# Patient Record
Sex: FEMALE | Race: WHITE | NOT HISPANIC OR LATINO | RURAL
[De-identification: names, ages, dates, MRNs, and addresses within clinical notes are randomized per-mention and may not be internally consistent; named-entity substitution may affect disease eponyms.]

---

## 2024-02-15 DIAGNOSIS — J35.1 SWOLLEN TONSIL: Primary | ICD-10-CM

## 2024-02-27 ENCOUNTER — OFFICE VISIT (OUTPATIENT)
Dept: DERMATOLOGY | Facility: CLINIC | Age: 69
End: 2024-02-27
Payer: COMMERCIAL

## 2024-02-27 DIAGNOSIS — Z79.899 HIGH RISK MEDICATION USE: ICD-10-CM

## 2024-02-27 DIAGNOSIS — L30.9 DERMATITIS, UNSPECIFIED: Primary | ICD-10-CM

## 2024-02-27 PROCEDURE — 99204 OFFICE O/P NEW MOD 45 MIN: CPT | Mod: ,,, | Performed by: STUDENT IN AN ORGANIZED HEALTH CARE EDUCATION/TRAINING PROGRAM

## 2024-02-27 PROCEDURE — 1159F MED LIST DOCD IN RCRD: CPT | Mod: ,,, | Performed by: STUDENT IN AN ORGANIZED HEALTH CARE EDUCATION/TRAINING PROGRAM

## 2024-02-27 RX ORDER — METRONIDAZOLE 7.5 MG/G
GEL TOPICAL 2 TIMES DAILY
Qty: 45 G | Refills: 5 | Status: SHIPPED | OUTPATIENT
Start: 2024-02-27 | End: 2024-03-28 | Stop reason: SDUPTHER

## 2024-02-27 RX ORDER — SUCRALFATE 1 G/1
TABLET ORAL
COMMUNITY
Start: 2023-10-06

## 2024-02-27 RX ORDER — MEPOLIZUMAB 100 MG/ML
INJECTION, SOLUTION SUBCUTANEOUS
COMMUNITY

## 2024-02-27 RX ORDER — ERGOCALCIFEROL 1.25 MG/1
50000 CAPSULE ORAL
COMMUNITY
Start: 2023-10-06

## 2024-02-27 RX ORDER — DOXYCYCLINE 100 MG/1
100 CAPSULE ORAL EVERY 12 HOURS
Qty: 60 CAPSULE | Refills: 0 | Status: SHIPPED | OUTPATIENT
Start: 2024-02-27 | End: 2024-03-28 | Stop reason: SDUPTHER

## 2024-02-27 RX ORDER — ESTRADIOL 0.05 MG/D
1 PATCH TRANSDERMAL
COMMUNITY

## 2024-02-27 RX ORDER — ONDANSETRON 4 MG/1
4 TABLET, ORALLY DISINTEGRATING ORAL
COMMUNITY
Start: 2024-02-05

## 2024-02-27 RX ORDER — BUDESONIDE 180 UG/1
AEROSOL, POWDER RESPIRATORY (INHALATION) 2 TIMES DAILY
COMMUNITY
Start: 2023-10-23

## 2024-02-27 RX ORDER — OMEPRAZOLE 40 MG/1
40 CAPSULE, DELAYED RELEASE ORAL EVERY MORNING
COMMUNITY

## 2024-02-27 RX ORDER — ALBUTEROL SULFATE 2 MG/5ML
2 SYRUP ORAL
COMMUNITY

## 2024-02-27 RX ORDER — MONTELUKAST SODIUM 10 MG/1
TABLET, FILM COATED ORAL
COMMUNITY
Start: 2023-11-07

## 2024-02-27 NOTE — PROGRESS NOTES
[facility]  Jhonny Medina MD    [address] [place] [address]  [phone]    Fax: [phone]    Patient Name: Caron Sandhu  Medical Record Number: [id number]  PCP: Eliza Castillo MD  Age: 68 y.o. : 1955  Contact: [phone] (home)     CC: rash  History of Present Illness:     Caron Sandhu is a 68 y.o.  female with no history of skin cancer  who presents to clinic today for rash on face. This has been present for one months. Symptoms includes redness, pustules and irritation. Previous treatments includes Cetaphil and stop using her sunscreen.  She is currently seeing Rheumatology with a positive RADHA, but no definitive diagnosis has been given.     The patient has no other concerns today.    Review of Systems:     Unremarkable other than mentioned above.     Physical Exam:     General: Relaxed, oriented, alert    Skin examination of the scalp, face, neck, chest, back, abdomen, upper extremities and lower extremities were normal except for as listed below      Assessment and Plan:     1. Dermatitis Unspecified  - erythematous, monomorphic papules with occasional pustules on chin, cheeks and periorbital area   DDx: inflammatory rosacea vs perioral facial dermatitis     Plan:  - Metronidazole gel   - Doxycycline 100 mg BID x 4 weeks    Counseling  I counseled the patient regarding the following:  Skin care: Patient instructed to use gentle skin care including dove unscented soap, CeraVe moisturizing cream, and fragrance free laundry detergent.  Expectations: The patient understands that there is not a definitive diagnosis at this time. Further testing or empiric therapy may be necessary to diagnose and improve the condition.  Contact office if: The patient develops a fever, or rash dramatically worsens despite treatment.      2. High Risk Medication Monitoring : The risks and benefits of the medication were reviewed in full with the patient. Should any  side effects occur, the patient will stop the medication and contact me immediately.      Doxycycline Counseling:     Please be aware of the side effects of doxycycline:   - photosensitivity and increased risk for sunburn. When exposed to sunlight, patients should wear protective clothing, sunglasses, and sunscreen.   - birth defects: avoid pregnancy while on therapy due to potential birth defects.  - headaches or vision changes if taking with accutane   - throat irritation: stay upright for at least 30 minutes after taking and drink with a large glass of water   - GI disturbance: take with food to help prevent upset stomach   - Do not take at the same time as dairy or calcium containing supplements, as they reduce absorption. You can continue to consume these, but make sure it is not within an hour of taking the doxycycline     Please call our office with any extreme side effects.       Return to clinic in 4 weeks    AVS printed with patient instructions     Jhonny Medina MD   Mohs Surgery/Dermatologic Oncology  Dermatology

## 2024-02-27 NOTE — PATIENT INSTRUCTIONS
Doxycycline Counseling:     Please be aware of the side effects of doxycycline:   - photosensitivity and increased risk for sunburn. When exposed to sunlight, patients should wear protective clothing, sunglasses, and sunscreen.   - birth defects: avoid pregnancy while on therapy due to potential birth defects.  - headaches or vision changes if taking with accutane   - throat irritation: stay upright for at least 30 minutes after taking and drink with a large glass of water   - GI disturbance: take with food to help prevent upset stomach   - Do not take at the same time as dairy or calcium containing supplements, as they reduce absorption. You can continue to consume these, but make sure it is not within an hour of taking the doxycycline     Please call our office with any extreme side effects. '    Rosacea vs perioral facial dermatitis     Doxycyline 100 mg twice a day for four weeks    Metronidazole gel - twice day

## 2024-03-28 ENCOUNTER — OFFICE VISIT (OUTPATIENT)
Dept: DERMATOLOGY | Facility: CLINIC | Age: 69
End: 2024-03-28
Payer: COMMERCIAL

## 2024-03-28 DIAGNOSIS — L71.9 ROSACEA: Primary | ICD-10-CM

## 2024-03-28 DIAGNOSIS — Z79.899 HIGH RISK MEDICATION USE: ICD-10-CM

## 2024-03-28 DIAGNOSIS — L30.9 DERMATITIS, UNSPECIFIED: ICD-10-CM

## 2024-03-28 PROCEDURE — 1159F MED LIST DOCD IN RCRD: CPT | Mod: ,,, | Performed by: STUDENT IN AN ORGANIZED HEALTH CARE EDUCATION/TRAINING PROGRAM

## 2024-03-28 PROCEDURE — 99213 OFFICE O/P EST LOW 20 MIN: CPT | Mod: ,,, | Performed by: STUDENT IN AN ORGANIZED HEALTH CARE EDUCATION/TRAINING PROGRAM

## 2024-03-28 RX ORDER — DOXYCYCLINE 100 MG/1
100 CAPSULE ORAL EVERY 12 HOURS
Qty: 28 CAPSULE | Refills: 3 | Status: SHIPPED | OUTPATIENT
Start: 2024-03-28 | End: 2024-04-11

## 2024-03-28 RX ORDER — METRONIDAZOLE 7.5 MG/G
GEL TOPICAL 2 TIMES DAILY
Qty: 45 G | Refills: 11 | Status: SHIPPED | OUTPATIENT
Start: 2024-03-28 | End: 2025-03-28

## 2024-03-28 NOTE — PROGRESS NOTES
[facility]  Jhonny Medina MD    [address] [place] [address]  [phone]    Fax: [phone]    Patient Name: Caron Sandhu  Medical Record Number: [id number]  PCP: Eliza Castillo MD  Age: 69 y.o. : 1955  Contact: [phone] (home)     History of Present Illness:     Caron Sandhu is a 69 y.o.  female here for follow up of inflammatory rosacea treated with metronidazole gel and doxycycline 100 mg BID x 4 weeks that has improved.     The patient has no other concerns today.    Review of Systems:     Unremarkable other than mentioned above.     Physical Exam:     General: Relaxed, oriented, alert    Skin examination of the scalp, face, neck, chest, back, abdomen, upper extremities and lower extremities were normal except for as listed below      Assessment and Plan:     1. Rosacea  - Erythematous papules and pustules on cheeks and nose with background erythema     Plan:   Continue Metronidazole gel bid PRN   Doxycycline 100 mg bid PRN for 10 days for flares     Counseling.  Rosacea can be a/w cardiac disease, and can involve the eyes as well.     2. High Risk Medication Monitoring : The risks and benefits of the medication were reviewed in full with the patient. Should any side effects occur, the patient will stop the medication and contact me immediately.    Doxycycline Counseling:     Please be aware of the side effects of doxycycline:   - photosensitivity and increased risk for sunburn. When exposed to sunlight, patients should wear protective clothing, sunglasses, and sunscreen.   - birth defects: avoid pregnancy while on therapy due to potential birth defects.  - headaches or vision changes if taking with accutane   - throat irritation: stay upright for at least 30 minutes after taking and drink with a large glass of water   - GI disturbance: take with food to help prevent upset stomach   - Do not take at the same time as dairy or calcium  containing supplements, as they reduce absorption. You can continue to consume these, but make sure it is not within an hour of taking the doxycycline     Please call our office with any extreme side effects.       Return to clinic in 1 year.     AVS printed with patient instructions     Jhonny Medina MD   Mohs Surgery/Dermatologic Oncology  Dermatology